# Patient Record
Sex: MALE | Race: WHITE | Employment: FULL TIME | URBAN - METROPOLITAN AREA
[De-identification: names, ages, dates, MRNs, and addresses within clinical notes are randomized per-mention and may not be internally consistent; named-entity substitution may affect disease eponyms.]

---

## 2019-09-30 PROBLEM — Z00.00 ROUTINE GENERAL MEDICAL EXAMINATION AT A HEALTH CARE FACILITY: Status: ACTIVE | Noted: 2019-09-30

## 2024-02-04 ENCOUNTER — HOSPITAL ENCOUNTER (OUTPATIENT)
Age: 59
Discharge: HOME | End: 2024-02-04
Payer: COMMERCIAL

## 2024-02-04 VITALS
TEMPERATURE: 99 F | OXYGEN SATURATION: 99 % | HEART RATE: 84 BPM | RESPIRATION RATE: 20 BRPM | DIASTOLIC BLOOD PRESSURE: 74 MMHG | SYSTOLIC BLOOD PRESSURE: 114 MMHG

## 2024-02-04 DIAGNOSIS — J11.1 INFLUENZA: Primary | ICD-10-CM

## 2024-02-04 LAB
POCT INFLUENZA A: POSITIVE
POCT INFLUENZA B: NEGATIVE
SARS-COV-2 RNA RESP QL NAA+PROBE: NOT DETECTED

## 2024-02-04 NOTE — ED PROVIDER NOTES
Patient Seen in: [facility]    History   CC: cough  HPI: Marko Molina 58 year old male  who presents c/o cough, congestion, sore throat with cough, headaches, and fever beginning evening of 2/1/2024. Denies esa, cp, hemoptysis, GI signs/symptoms, dizziness, vision changes.  History of COVID infection 2 months ago.    Past Medical History:   Diagnosis Date    ALLERGIC RHINITIS     seasonal    HYPERLIPIDEMIA        Past Surgical History:   Procedure Laterality Date    APPENDECTOMY      COLONOSCOPY  3/15    tics; repeat 10 yrs    COLONOSCOPY,DIAGNOSTIC N/A 3/17/2015    Procedure: COLONOSCOPY, POSSIBLE BIOPSY, POSSIBLE POLYPECTOMY 33746;  Surgeon: Avtar Mena MD;  Location: [facility] [place], [facility]       Family History   Problem Relation Age of Onset    Cancer Father         prostate    Cancer Mother         breast       Social History     Socioeconomic History    Marital status:    Tobacco Use    Smoking status: Never    Smokeless tobacco: Never   Substance and Sexual Activity    Alcohol use: Yes     Comment: 1/week    Drug use: No       ROS:  Review of Systems    Positive for stated complaint: headache  Other systems are as noted in HPI.  Constitutional and vital signs reviewed.      All other systems reviewed and negative except as noted above.    PSFH elements reviewed from today and agreed except as otherwise stated in HPI.             Constitutional and vital signs reviewed.        Physical Exam     ED Triage Vitals [02/04/24 1218]   /74   Pulse 84   Resp 20   Temp 98.7 °F (37.1 °C)   Temp src Temporal   SpO2 99 %   O2 Device None (Room air)       Current:/74   Pulse 84   Temp 98.7 °F (37.1 °C) (Temporal)   Resp 20   SpO2 99%         PE:  General - Appears well, non-toxic and in NAD  Head - Appears symmetrical without deformity/swelling cranium, scalp, or facial bones  Eyes - sclera not injected, no discharge noted, no periorbital edema  ENT - EAC bilaterally without  discharge, TM pearly grey with COL visualized appropriately bilaterally.   no nasal drainage noted in nares bilat, no cobblestoning to post. Pharynx.   Oropharynx clear, posterior pharynx is without erythema and without tonsilar enlargement or exudate, uvula midline, +gag, voice is clear. No trismus  Neck - no significant adenopathy, supple with trachea midline  Resp - Lung sounds clear bilaterally and wob unlabored, good aeration with equal, even expansion bilaterally   CV - RRR  Skin - no rashes or petechiae noted, pink warm and dry throughout, mmm, cap refill <2seconds  Neuro - A&O x4, steady gait  MSK - makes purposeful movements of all extremities, radial pulses 2+ bilat.  Psych - Interactive and appropriate      ED Course     Labs Reviewed   POCT FLU TEST - Abnormal; Notable for the following components:       Result Value    POCT INFLUENZA A Positive (*)     All other components within normal limits    Narrative:     This assay is a rapid molecular in vitro test utilizing nucleic acid amplification of influenza A and B viral RNA.   RAPID SARS-COV-2 BY PCR - Normal       MDM   DDx: Influenza, COVID-19, unspecified viral illness    Influenza A positive.  Rapid COVID PCR negative.  General viral illness instructions reviewed, rest, hydration instructions, Tylenol or Motrin as needed for discomfort, cough suppressants, throat lozenges etc. as well as follow-up and return/ED precautions reviewed.  Patient declines prescriptive cough suppressant.  He is out of the 48-hour antiviral window.  He demonstrates understanding of all instruction and agrees with plan of care.      Disposition and Plan     Clinical Impression:  1. Influenza        Disposition:  Discharge    Follow-up:  Mikael Sexton MD   [address] [id number] [phone]    Schedule an appointment as soon as possible for a visit in 2 days        Medications Prescribed:  Current Discharge Medication List

## 2024-02-04 NOTE — ED INITIAL ASSESSMENT (HPI)
Pt reporting headache and sore neck  that started Thursday afternoon. Worsening headache with lights. Fevers tmax 103.1 with last one Saturday afternoon and slight shortness of breath, emesis today x1, loss of appetite.  Denies chest pain, blurry vision, or dizziness.